# Patient Record
Sex: MALE | Race: BLACK OR AFRICAN AMERICAN | NOT HISPANIC OR LATINO
[De-identification: names, ages, dates, MRNs, and addresses within clinical notes are randomized per-mention and may not be internally consistent; named-entity substitution may affect disease eponyms.]

---

## 2020-09-28 PROBLEM — Z00.00 ENCOUNTER FOR PREVENTIVE HEALTH EXAMINATION: Status: ACTIVE | Noted: 2020-09-28

## 2020-10-20 ENCOUNTER — APPOINTMENT (OUTPATIENT)
Dept: ORTHOPEDIC SURGERY | Facility: CLINIC | Age: 50
End: 2020-10-20
Payer: MEDICAID

## 2020-10-20 ENCOUNTER — LABORATORY RESULT (OUTPATIENT)
Age: 50
End: 2020-10-20

## 2020-10-20 VITALS
WEIGHT: 245 LBS | DIASTOLIC BLOOD PRESSURE: 86 MMHG | TEMPERATURE: 95.5 F | HEART RATE: 69 BPM | BODY MASS INDEX: 33.18 KG/M2 | SYSTOLIC BLOOD PRESSURE: 130 MMHG | HEIGHT: 72 IN

## 2020-10-20 PROCEDURE — 99072 ADDL SUPL MATRL&STAF TM PHE: CPT

## 2020-10-20 PROCEDURE — 73562 X-RAY EXAM OF KNEE 3: CPT | Mod: RT

## 2020-10-20 PROCEDURE — 20610 DRAIN/INJ JOINT/BURSA W/O US: CPT | Mod: RT

## 2020-10-20 PROCEDURE — 99204 OFFICE O/P NEW MOD 45 MIN: CPT | Mod: 25

## 2020-10-20 RX ADMIN — METHYLPREDNISOLONE ACETATE 2 MG/ML: 40 INJECTION, SUSPENSION INTRALESIONAL; INTRAMUSCULAR; INTRASYNOVIAL; SOFT TISSUE at 00:00

## 2020-10-20 RX ADMIN — LIDOCAINE HYDROCHLORIDE 10 %: 10 INJECTION, SOLUTION INFILTRATION; PERINEURAL at 00:00

## 2020-10-20 RX ADMIN — LIDOCAINE HYDROCHLORIDE 3 %: 10 INJECTION, SOLUTION INFILTRATION; PERINEURAL at 00:00

## 2020-10-21 RX ORDER — LIDOCAINE HYDROCHLORIDE 10 MG/ML
1 INJECTION, SOLUTION INFILTRATION; PERINEURAL
Refills: 0 | Status: COMPLETED | OUTPATIENT
Start: 2020-10-20

## 2020-10-21 RX ORDER — METHYLPRED ACET/NACL,ISO-OS/PF 40 MG/ML
40 VIAL (ML) INJECTION
Qty: 1 | Refills: 0 | Status: COMPLETED | OUTPATIENT
Start: 2020-10-20

## 2020-11-03 ENCOUNTER — RX RENEWAL (OUTPATIENT)
Age: 50
End: 2020-11-03

## 2020-11-03 RX ORDER — MELOXICAM 15 MG/1
15 TABLET ORAL DAILY
Qty: 21 | Refills: 0 | Status: ACTIVE | COMMUNITY
Start: 2020-10-20 | End: 1900-01-01

## 2020-12-29 ENCOUNTER — APPOINTMENT (OUTPATIENT)
Dept: ORTHOPEDIC SURGERY | Facility: CLINIC | Age: 50
End: 2020-12-29

## 2021-01-19 DIAGNOSIS — M25.562 PAIN IN LEFT KNEE: ICD-10-CM

## 2021-02-01 ENCOUNTER — APPOINTMENT (OUTPATIENT)
Dept: ORTHOPEDIC SURGERY | Facility: CLINIC | Age: 51
End: 2021-02-01

## 2021-02-04 ENCOUNTER — APPOINTMENT (OUTPATIENT)
Dept: ORTHOPEDIC SURGERY | Facility: CLINIC | Age: 51
End: 2021-02-04

## 2021-02-26 DIAGNOSIS — M25.561 PAIN IN RIGHT KNEE: ICD-10-CM

## 2021-02-26 DIAGNOSIS — M79.609 PAIN IN UNSPECIFIED LIMB: ICD-10-CM

## 2021-06-22 ENCOUNTER — LABORATORY RESULT (OUTPATIENT)
Age: 51
End: 2021-06-22

## 2021-06-22 ENCOUNTER — APPOINTMENT (OUTPATIENT)
Dept: ORTHOPEDIC SURGERY | Facility: CLINIC | Age: 51
End: 2021-06-22
Payer: MEDICAID

## 2021-06-22 VITALS
HEART RATE: 90 BPM | TEMPERATURE: 97.3 F | BODY MASS INDEX: 30.88 KG/M2 | SYSTOLIC BLOOD PRESSURE: 141 MMHG | HEIGHT: 72 IN | WEIGHT: 228 LBS | DIASTOLIC BLOOD PRESSURE: 98 MMHG

## 2021-06-22 DIAGNOSIS — M25.462 EFFUSION, LEFT KNEE: ICD-10-CM

## 2021-06-22 PROCEDURE — 20610 DRAIN/INJ JOINT/BURSA W/O US: CPT | Mod: 50

## 2021-06-22 PROCEDURE — 73562 X-RAY EXAM OF KNEE 3: CPT | Mod: LT

## 2021-06-22 PROCEDURE — 99213 OFFICE O/P EST LOW 20 MIN: CPT | Mod: 25

## 2021-06-22 RX ORDER — MELOXICAM 15 MG/1
15 TABLET ORAL DAILY
Qty: 21 | Refills: 1 | Status: ACTIVE | COMMUNITY
Start: 2021-06-22 | End: 1900-01-01

## 2021-06-22 RX ADMIN — LIDOCAINE HYDROCHLORIDE 3 %: 10 INJECTION, SOLUTION INFILTRATION; PERINEURAL at 00:00

## 2021-06-22 RX ADMIN — LIDOCAINE HYDROCHLORIDE 5 %: 10 INJECTION, SOLUTION INFILTRATION; PERINEURAL at 00:00

## 2021-06-22 RX ADMIN — METHYLPREDNISOLONE ACETATE 2 MG/ML: 40 INJECTION, SUSPENSION INTRALESIONAL; INTRAMUSCULAR; INTRASYNOVIAL; SOFT TISSUE at 00:00

## 2021-06-23 RX ORDER — LIDOCAINE HYDROCHLORIDE 10 MG/ML
1 INJECTION, SOLUTION INFILTRATION; PERINEURAL
Refills: 0 | Status: COMPLETED | OUTPATIENT
Start: 2021-06-22

## 2021-06-23 RX ORDER — METHYLPRED ACET/NACL,ISO-OS/PF 40 MG/ML
40 VIAL (ML) INJECTION
Qty: 1 | Refills: 0 | Status: COMPLETED | OUTPATIENT
Start: 2021-06-22

## 2021-08-17 ENCOUNTER — APPOINTMENT (OUTPATIENT)
Dept: ORTHOPEDIC SURGERY | Facility: CLINIC | Age: 51
End: 2021-08-17

## 2021-09-23 ENCOUNTER — APPOINTMENT (OUTPATIENT)
Dept: ORTHOPEDIC SURGERY | Facility: CLINIC | Age: 51
End: 2021-09-23

## 2021-09-28 ENCOUNTER — APPOINTMENT (OUTPATIENT)
Dept: ORTHOPEDIC SURGERY | Facility: CLINIC | Age: 51
End: 2021-09-28
Payer: MEDICAID

## 2021-09-28 ENCOUNTER — NON-APPOINTMENT (OUTPATIENT)
Age: 51
End: 2021-09-28

## 2021-09-28 VITALS — WEIGHT: 228 LBS | BODY MASS INDEX: 30.88 KG/M2 | HEIGHT: 72 IN

## 2021-09-28 DIAGNOSIS — M17.0 BILATERAL PRIMARY OSTEOARTHRITIS OF KNEE: ICD-10-CM

## 2021-09-28 DIAGNOSIS — M25.461 EFFUSION, RIGHT KNEE: ICD-10-CM

## 2021-09-28 PROCEDURE — 99211 OFF/OP EST MAY X REQ PHY/QHP: CPT | Mod: 25

## 2021-09-28 PROCEDURE — 73562 X-RAY EXAM OF KNEE 3: CPT | Mod: LT

## 2021-09-28 PROCEDURE — 20610 DRAIN/INJ JOINT/BURSA W/O US: CPT | Mod: LT

## 2021-09-28 RX ORDER — MELOXICAM 15 MG/1
15 TABLET ORAL DAILY
Qty: 21 | Refills: 1 | Status: ACTIVE | COMMUNITY
Start: 2021-09-28 | End: 1900-01-01

## 2021-09-28 RX ADMIN — METHYLPREDNISOLONE ACETATE 2 MG/ML: 40 INJECTION, SUSPENSION INTRALESIONAL; INTRAMUSCULAR; INTRASYNOVIAL; SOFT TISSUE at 00:00

## 2021-09-28 RX ADMIN — LIDOCAINE HYDROCHLORIDE 3 %: 10 INJECTION, SOLUTION INFILTRATION; PERINEURAL at 00:00

## 2021-09-28 RX ADMIN — LIDOCAINE HYDROCHLORIDE 5 %: 10 INJECTION, SOLUTION INFILTRATION; PERINEURAL at 00:00

## 2021-09-29 RX ORDER — METHYLPRED ACET/NACL,ISO-OS/PF 40 MG/ML
40 VIAL (ML) INJECTION
Qty: 1 | Refills: 0 | Status: COMPLETED | OUTPATIENT
Start: 2021-09-28

## 2021-09-29 RX ORDER — LIDOCAINE HYDROCHLORIDE 10 MG/ML
1 INJECTION, SOLUTION INFILTRATION; PERINEURAL
Refills: 0 | Status: COMPLETED | OUTPATIENT
Start: 2021-09-28

## 2024-04-30 ENCOUNTER — OFFICE (OUTPATIENT)
Dept: URBAN - METROPOLITAN AREA CLINIC 12 | Facility: CLINIC | Age: 54
Setting detail: OPHTHALMOLOGY
End: 2024-04-30
Payer: MEDICAID

## 2024-04-30 DIAGNOSIS — H31.003: ICD-10-CM

## 2024-04-30 DIAGNOSIS — H25.11: ICD-10-CM

## 2024-04-30 DIAGNOSIS — H40.1131: ICD-10-CM

## 2024-04-30 PROCEDURE — 92004 COMPRE OPH EXAM NEW PT 1/>: CPT | Performed by: STUDENT IN AN ORGANIZED HEALTH CARE EDUCATION/TRAINING PROGRAM

## 2024-04-30 PROCEDURE — 92020 GONIOSCOPY: CPT | Performed by: STUDENT IN AN ORGANIZED HEALTH CARE EDUCATION/TRAINING PROGRAM

## 2024-04-30 PROCEDURE — 92250 FUNDUS PHOTOGRAPHY W/I&R: CPT | Performed by: STUDENT IN AN ORGANIZED HEALTH CARE EDUCATION/TRAINING PROGRAM

## 2024-05-08 ENCOUNTER — OFFICE (OUTPATIENT)
Dept: URBAN - METROPOLITAN AREA CLINIC 12 | Facility: CLINIC | Age: 54
Setting detail: OPHTHALMOLOGY
End: 2024-05-08
Payer: MEDICAID

## 2024-05-08 ENCOUNTER — RX ONLY (RX ONLY)
Age: 54
End: 2024-05-08

## 2024-05-08 DIAGNOSIS — H31.003: ICD-10-CM

## 2024-05-08 DIAGNOSIS — H25.13: ICD-10-CM

## 2024-05-08 DIAGNOSIS — H40.063: ICD-10-CM

## 2024-05-08 PROCEDURE — 76514 ECHO EXAM OF EYE THICKNESS: CPT | Performed by: STUDENT IN AN ORGANIZED HEALTH CARE EDUCATION/TRAINING PROGRAM

## 2024-05-08 PROCEDURE — 92014 COMPRE OPH EXAM EST PT 1/>: CPT | Performed by: STUDENT IN AN ORGANIZED HEALTH CARE EDUCATION/TRAINING PROGRAM

## 2024-05-08 ASSESSMENT — CONFRONTATIONAL VISUAL FIELD TEST (CVF)
OD_FINDINGS: FULL
OS_FINDINGS: FULL

## 2024-05-09 ENCOUNTER — Encounter (OUTPATIENT)
Dept: URBAN - METROPOLITAN AREA CLINIC 12 | Facility: CLINIC | Age: 54
Setting detail: OPHTHALMOLOGY
End: 2024-05-09
Payer: MEDICAID

## 2024-05-13 ENCOUNTER — OFFICE (OUTPATIENT)
Dept: URBAN - METROPOLITAN AREA CLINIC 12 | Facility: CLINIC | Age: 54
Setting detail: OPHTHALMOLOGY
End: 2024-05-13
Payer: MEDICAID

## 2024-05-13 DIAGNOSIS — H40.061: ICD-10-CM

## 2024-05-13 PROCEDURE — 66761 REVISION OF IRIS: CPT | Mod: RT | Performed by: STUDENT IN AN ORGANIZED HEALTH CARE EDUCATION/TRAINING PROGRAM

## 2024-05-13 ASSESSMENT — CONFRONTATIONAL VISUAL FIELD TEST (CVF)
OD_FINDINGS: FULL
OS_FINDINGS: FULL

## 2024-05-22 ENCOUNTER — RX ONLY (RX ONLY)
Age: 54
End: 2024-05-22

## 2024-05-22 ENCOUNTER — OFFICE (OUTPATIENT)
Dept: URBAN - METROPOLITAN AREA CLINIC 12 | Facility: CLINIC | Age: 54
Setting detail: OPHTHALMOLOGY
End: 2024-05-22
Payer: MEDICAID

## 2024-05-22 DIAGNOSIS — H40.062: ICD-10-CM

## 2024-05-22 PROCEDURE — 66761 REVISION OF IRIS: CPT | Mod: 79,LT | Performed by: STUDENT IN AN ORGANIZED HEALTH CARE EDUCATION/TRAINING PROGRAM

## 2024-05-22 ASSESSMENT — CONFRONTATIONAL VISUAL FIELD TEST (CVF)
OD_FINDINGS: FULL
OS_FINDINGS: FULL

## 2024-05-29 ENCOUNTER — OFFICE (OUTPATIENT)
Dept: URBAN - METROPOLITAN AREA CLINIC 12 | Facility: CLINIC | Age: 54
Setting detail: OPHTHALMOLOGY
End: 2024-05-29
Payer: MEDICAID

## 2024-05-29 DIAGNOSIS — H40.063: ICD-10-CM

## 2024-05-29 PROBLEM — H40.033 NARROW ANGLE GLAUCOMA SUSPECT; BOTH EYES: Status: ACTIVE | Noted: 2024-05-29

## 2024-05-29 PROBLEM — H25.13 CATARACT SENILE NUCLEAR SCLEROSIS; BOTH EYES: Status: ACTIVE | Noted: 2024-05-08

## 2024-05-29 PROCEDURE — 99024 POSTOP FOLLOW-UP VISIT: CPT | Performed by: STUDENT IN AN ORGANIZED HEALTH CARE EDUCATION/TRAINING PROGRAM

## 2024-05-29 ASSESSMENT — CONFRONTATIONAL VISUAL FIELD TEST (CVF)
OS_FINDINGS: FULL
OD_FINDINGS: FULL

## 2024-06-06 ENCOUNTER — RX ONLY (RX ONLY)
Age: 54
End: 2024-06-06

## 2024-06-06 ENCOUNTER — OFFICE (OUTPATIENT)
Dept: URBAN - METROPOLITAN AREA CLINIC 12 | Facility: CLINIC | Age: 54
Setting detail: OPHTHALMOLOGY
End: 2024-06-06
Payer: COMMERCIAL

## 2024-06-06 DIAGNOSIS — H40.063: ICD-10-CM

## 2024-06-06 DIAGNOSIS — H25.13: ICD-10-CM

## 2024-06-06 PROCEDURE — 99214 OFFICE O/P EST MOD 30 MIN: CPT | Performed by: STUDENT IN AN ORGANIZED HEALTH CARE EDUCATION/TRAINING PROGRAM

## 2024-06-06 ASSESSMENT — CONFRONTATIONAL VISUAL FIELD TEST (CVF)
OD_FINDINGS: FULL
OS_FINDINGS: FULL

## 2024-06-17 ENCOUNTER — RX ONLY (RX ONLY)
Age: 54
End: 2024-06-17

## 2024-06-17 ENCOUNTER — OFFICE (OUTPATIENT)
Dept: URBAN - METROPOLITAN AREA CLINIC 12 | Facility: CLINIC | Age: 54
Setting detail: OPHTHALMOLOGY
End: 2024-06-17
Payer: COMMERCIAL

## 2024-06-17 DIAGNOSIS — H25.13: ICD-10-CM

## 2024-06-17 DIAGNOSIS — H40.063: ICD-10-CM

## 2024-06-17 PROCEDURE — 99213 OFFICE O/P EST LOW 20 MIN: CPT | Performed by: STUDENT IN AN ORGANIZED HEALTH CARE EDUCATION/TRAINING PROGRAM

## 2024-06-17 ASSESSMENT — CONFRONTATIONAL VISUAL FIELD TEST (CVF)
OS_FINDINGS: FULL
OD_FINDINGS: FULL

## 2024-07-05 ENCOUNTER — OFFICE (OUTPATIENT)
Dept: URBAN - METROPOLITAN AREA CLINIC 12 | Facility: CLINIC | Age: 54
Setting detail: OPHTHALMOLOGY
End: 2024-07-05
Payer: COMMERCIAL

## 2024-07-05 DIAGNOSIS — H40.063: ICD-10-CM

## 2024-07-05 PROCEDURE — 99213 OFFICE O/P EST LOW 20 MIN: CPT | Performed by: STUDENT IN AN ORGANIZED HEALTH CARE EDUCATION/TRAINING PROGRAM

## 2024-07-05 ASSESSMENT — CONFRONTATIONAL VISUAL FIELD TEST (CVF)
OS_FINDINGS: FULL
OD_FINDINGS: FULL

## 2024-07-15 ENCOUNTER — OFFICE (OUTPATIENT)
Dept: URBAN - METROPOLITAN AREA CLINIC 12 | Facility: CLINIC | Age: 54
Setting detail: OPHTHALMOLOGY
End: 2024-07-15
Payer: COMMERCIAL

## 2024-07-15 ENCOUNTER — RX ONLY (RX ONLY)
Age: 54
End: 2024-07-15

## 2024-07-15 DIAGNOSIS — H40.063: ICD-10-CM

## 2024-07-15 PROCEDURE — 99213 OFFICE O/P EST LOW 20 MIN: CPT | Performed by: STUDENT IN AN ORGANIZED HEALTH CARE EDUCATION/TRAINING PROGRAM

## 2024-07-15 ASSESSMENT — CONFRONTATIONAL VISUAL FIELD TEST (CVF)
OS_FINDINGS: FULL
OD_FINDINGS: FULL

## 2024-08-23 ENCOUNTER — APPOINTMENT (OUTPATIENT)
Dept: ORTHOPEDIC SURGERY | Facility: CLINIC | Age: 54
End: 2024-08-23
Payer: MEDICAID

## 2024-08-23 VITALS — SYSTOLIC BLOOD PRESSURE: 132 MMHG | HEART RATE: 67 BPM | DIASTOLIC BLOOD PRESSURE: 95 MMHG

## 2024-08-23 DIAGNOSIS — M17.0 BILATERAL PRIMARY OSTEOARTHRITIS OF KNEE: ICD-10-CM

## 2024-08-23 PROCEDURE — 99214 OFFICE O/P EST MOD 30 MIN: CPT | Mod: 25

## 2024-08-23 PROCEDURE — 73562 X-RAY EXAM OF KNEE 3: CPT | Mod: LT,RT

## 2024-08-23 RX ORDER — TRAZODONE HYDROCHLORIDE 300 MG/1
TABLET ORAL
Refills: 0 | Status: ACTIVE | COMMUNITY

## 2024-08-23 RX ORDER — AMLODIPINE BESYLATE 5 MG/1
TABLET ORAL
Refills: 0 | Status: ACTIVE | COMMUNITY

## 2024-08-23 RX ORDER — ATORVASTATIN CALCIUM 80 MG/1
TABLET, FILM COATED ORAL
Refills: 0 | Status: ACTIVE | COMMUNITY

## 2024-08-25 NOTE — DISCUSSION/SUMMARY
[de-identified] : After thorough history full examination and review of the x-rays.  It was explained to the patient that he does have osteoarthritic changes in both the left and right knee.  He was explained the different modalities of treatment which include conservative versus surgical intervention.  He was also spoken to about viscosupplementation injections as well as cortisone injections.  He was explained the risk benefits pros and cons to each and all of the possible treatments and outcomes.  However he did state that he would like to try the viscosupplementation injections.  Therefore a formal request through the insurance company will be made.  And once authorized he will return back to the office to have administered to the left and right knee.  In the meantime it is suggested that he continue with conservative measures including therapy exercises and anti-inflammatories as needed.  45 minutes were spent, face to face, in direct consultation with the patient. This includes reviewing the natural history of their Dx., eliciting the history, performing an orthopedic exam, review of the x-ray findings, forming a differential Dx and discussing all treatment options. This Includes both surgical and non-surgical treatments. I also reviewed all the risks and benefits of non-operative & operative Tx options, future impact into orthopedic functions/problems, activity restrictions both at home and at work, and all follow up requirements.

## 2024-08-25 NOTE — HISTORY OF PRESENT ILLNESS
[de-identified] : Patient is a 54-year-old male who presents today for an evaluation of both knees.  He states that he had some discomfort in both knees for a prolonged period of time.  I was even seen in this office in 2021.  He was explained that he does have some osteoarthritic changes at that time.  And in the future may require knee replacement.  However he states that he has been treated conservatively.  And presents today for a possible viscosupplementation injection.  He states that he does have some swelling and clicking and that the pain is worse with any strenuous activities.  Currently the pain is a 5 on a scale of 1-10.

## 2024-08-25 NOTE — PHYSICAL EXAM
[Antalgic] : antalgic [LE] : Sensory: Intact in bilateral lower extremities [ALL] : dorsalis pedis, posterior tibial, femoral, popliteal, and radial 2+ and symmetric bilaterally [de-identified] : On physical examination of both the left and right knee.  The skin is clean dry and intact with no areas of redness swelling or heat noted.  There is some diffuse pain and tenderness mostly in the patellofemoral compartment as well as in the medial femoral-tibial compartment.  With the right side being worse.  He does have excellent range of motion.  His he is able to fully extend the knee with flexion to approximately 125 degrees in both knees.  This is done both passive and actively.  There is no evidence of ligamentous laxity.  No evidence of any muscle atrophy.  No evidence of any motor or sensory deficit.  Patient has good distal pulses with no calf tenderness. [de-identified] : X-rays of the right knee were taken today. This includes 3 views. The knee AP, lateral and sunrise views. They reveal osteoarthritic changes in all 3 views.  There is narrowing of the joint spacing in the patellofemoral compartment but mostly in the medial femoral-tibial compartment.  There is osteophyte formation as well as areas of sclerosis and cystic formation.  There is no evidence of any fracture or dislocation noted.  X-rays of the left knee were taken today. This includes 3 views. The knee AP, lateral and sunrise views. They reveal osteoarthritic changes in all 3 views.  There is narrowing of the joint spacing in the patellofemoral compartment but mostly in the medial femoral-tibial compartment.  There is osteophyte formation as well as areas of sclerosis and cystic formation.  There is no evidence of any fracture or dislocation noted.

## 2024-08-25 NOTE — DISCUSSION/SUMMARY
[de-identified] : After thorough history full examination and review of the x-rays.  It was explained to the patient that he does have osteoarthritic changes in both the left and right knee.  He was explained the different modalities of treatment which include conservative versus surgical intervention.  He was also spoken to about viscosupplementation injections as well as cortisone injections.  He was explained the risk benefits pros and cons to each and all of the possible treatments and outcomes.  However he did state that he would like to try the viscosupplementation injections.  Therefore a formal request through the insurance company will be made.  And once authorized he will return back to the office to have administered to the left and right knee.  In the meantime it is suggested that he continue with conservative measures including therapy exercises and anti-inflammatories as needed.  45 minutes were spent, face to face, in direct consultation with the patient. This includes reviewing the natural history of their Dx., eliciting the history, performing an orthopedic exam, review of the x-ray findings, forming a differential Dx and discussing all treatment options. This Includes both surgical and non-surgical treatments. I also reviewed all the risks and benefits of non-operative & operative Tx options, future impact into orthopedic functions/problems, activity restrictions both at home and at work, and all follow up requirements.

## 2024-08-25 NOTE — PHYSICAL EXAM
[Antalgic] : antalgic [LE] : Sensory: Intact in bilateral lower extremities [ALL] : dorsalis pedis, posterior tibial, femoral, popliteal, and radial 2+ and symmetric bilaterally [de-identified] : On physical examination of both the left and right knee.  The skin is clean dry and intact with no areas of redness swelling or heat noted.  There is some diffuse pain and tenderness mostly in the patellofemoral compartment as well as in the medial femoral-tibial compartment.  With the right side being worse.  He does have excellent range of motion.  His he is able to fully extend the knee with flexion to approximately 125 degrees in both knees.  This is done both passive and actively.  There is no evidence of ligamentous laxity.  No evidence of any muscle atrophy.  No evidence of any motor or sensory deficit.  Patient has good distal pulses with no calf tenderness. [de-identified] : X-rays of the right knee were taken today. This includes 3 views. The knee AP, lateral and sunrise views. They reveal osteoarthritic changes in all 3 views.  There is narrowing of the joint spacing in the patellofemoral compartment but mostly in the medial femoral-tibial compartment.  There is osteophyte formation as well as areas of sclerosis and cystic formation.  There is no evidence of any fracture or dislocation noted.  X-rays of the left knee were taken today. This includes 3 views. The knee AP, lateral and sunrise views. They reveal osteoarthritic changes in all 3 views.  There is narrowing of the joint spacing in the patellofemoral compartment but mostly in the medial femoral-tibial compartment.  There is osteophyte formation as well as areas of sclerosis and cystic formation.  There is no evidence of any fracture or dislocation noted.

## 2024-08-25 NOTE — HISTORY OF PRESENT ILLNESS
[de-identified] : Patient is a 54-year-old male who presents today for an evaluation of both knees.  He states that he had some discomfort in both knees for a prolonged period of time.  I was even seen in this office in 2021.  He was explained that he does have some osteoarthritic changes at that time.  And in the future may require knee replacement.  However he states that he has been treated conservatively.  And presents today for a possible viscosupplementation injection.  He states that he does have some swelling and clicking and that the pain is worse with any strenuous activities.  Currently the pain is a 5 on a scale of 1-10.

## 2024-08-26 ENCOUNTER — OFFICE (OUTPATIENT)
Dept: URBAN - METROPOLITAN AREA CLINIC 12 | Facility: CLINIC | Age: 54
Setting detail: OPHTHALMOLOGY
End: 2024-08-26
Payer: COMMERCIAL

## 2024-08-26 DIAGNOSIS — H40.063: ICD-10-CM

## 2024-08-26 PROCEDURE — 99213 OFFICE O/P EST LOW 20 MIN: CPT | Performed by: STUDENT IN AN ORGANIZED HEALTH CARE EDUCATION/TRAINING PROGRAM

## 2024-08-26 ASSESSMENT — CONFRONTATIONAL VISUAL FIELD TEST (CVF)
OS_FINDINGS: FULL
OD_FINDINGS: FULL

## 2024-08-29 RX ORDER — HYALURONATE SODIUM, STABILIZED 88 MG/4 ML
88 SYRINGE (ML) INTRAARTICULAR
Qty: 2 | Refills: 0 | Status: ACTIVE | OUTPATIENT
Start: 2024-08-26

## 2024-10-09 ENCOUNTER — OFFICE (OUTPATIENT)
Dept: URBAN - METROPOLITAN AREA CLINIC 12 | Facility: CLINIC | Age: 54
Setting detail: OPHTHALMOLOGY
End: 2024-10-09
Payer: COMMERCIAL

## 2024-10-09 DIAGNOSIS — H40.063: ICD-10-CM

## 2024-10-09 DIAGNOSIS — H25.13: ICD-10-CM

## 2024-10-09 PROCEDURE — 99213 OFFICE O/P EST LOW 20 MIN: CPT | Performed by: STUDENT IN AN ORGANIZED HEALTH CARE EDUCATION/TRAINING PROGRAM

## 2024-10-09 PROCEDURE — 92133 CPTRZD OPH DX IMG PST SGM ON: CPT | Performed by: STUDENT IN AN ORGANIZED HEALTH CARE EDUCATION/TRAINING PROGRAM

## 2024-10-09 PROCEDURE — 92083 EXTENDED VISUAL FIELD XM: CPT | Performed by: STUDENT IN AN ORGANIZED HEALTH CARE EDUCATION/TRAINING PROGRAM

## 2024-10-09 ASSESSMENT — REFRACTION_CURRENTRX
OS_SPHERE: +2.25
OD_SPHERE: +2.00
OS_AXIS: 087
OD_ADD: +2.50
OS_CYLINDER: -0.25
OD_OVR_VA: 20/
OD_ADD: +2.50
OS_CYLINDER: -0.50
OS_OVR_VA: 20/
OD_VPRISM_DIRECTION: BF
OD_AXIS: 89
OS_AXIS: 98
OD_CYLINDER: -0.75
OS_OVR_VA: 20/
OD_VPRISM_DIRECTION: SV
OS_SPHERE: +2.00
OD_SPHERE: +2.00
OS_VPRISM_DIRECTION: BF
OD_CYLINDER: -0.75
OD_AXIS: 093
OS_ADD: +2.50
OS_ADD: +2.50
OS_VPRISM_DIRECTION: SV
OD_OVR_VA: 20/

## 2024-10-09 ASSESSMENT — KERATOMETRY
OS_K1POWER_DIOPTERS: 42.25
OD_K1POWER_DIOPTERS: 41.75
OS_K2POWER_DIOPTERS: 42.50
OD_AXISANGLE_DEGREES: 131
OS_AXISANGLE_DEGREES: 041
OD_K2POWER_DIOPTERS: 42.50
METHOD_AUTO_MANUAL: AUTO

## 2024-10-09 ASSESSMENT — REFRACTION_MANIFEST
OD_AXIS: 094
OS_SPHERE: +2.50
OD_VA1: 20/20
OD_SPHERE: +2.75
OS_AXIS: 090
OD_VA1: 20/20
OD_SPHERE: +2.25
OD_CYLINDER: -1.25
OS_CYLINDER: -0.75
OS_AXIS: 086
OS_VA1: 20/20
OS_SPHERE: +2.75
OD_CYLINDER: -0.75
OS_CYLINDER: -0.50
OS_VA1: 20/25
OD_AXIS: 098

## 2024-10-09 ASSESSMENT — REFRACTION_AUTOREFRACTION
OS_AXIS: 081
OS_SPHERE: +3.50
OD_CYLINDER: -1.50
OD_AXIS: 103
OD_SPHERE: +3.00
OS_CYLINDER: -1.25

## 2024-10-09 ASSESSMENT — CONFRONTATIONAL VISUAL FIELD TEST (CVF)
OS_FINDINGS: FULL
OD_FINDINGS: FULL

## 2024-10-09 ASSESSMENT — VISUAL ACUITY
OD_BCVA: 20/25-1
OS_BCVA: 20/25-1

## 2025-01-07 ENCOUNTER — OFFICE (OUTPATIENT)
Facility: LOCATION | Age: 55
Setting detail: OPHTHALMOLOGY
End: 2025-01-07
Payer: COMMERCIAL

## 2025-01-07 DIAGNOSIS — H40.063: ICD-10-CM

## 2025-01-07 DIAGNOSIS — H25.13: ICD-10-CM

## 2025-01-07 PROCEDURE — 92012 INTRM OPH EXAM EST PATIENT: CPT | Performed by: OPHTHALMOLOGY

## 2025-01-07 ASSESSMENT — REFRACTION_CURRENTRX
OD_OVR_VA: 20/
OS_OVR_VA: 20/
OD_ADD: +2.50
OS_CYLINDER: -0.50
OD_SPHERE: +2.00
OD_CYLINDER: -0.75
OS_SPHERE: +2.00
OD_CYLINDER: -0.75
OS_AXIS: 98
OD_AXIS: 084
OS_CYLINDER: -0.25
OS_SPHERE: +2.25
OS_ADD: +2.50
OD_AXIS: 89
OS_OVR_VA: 20/
OS_VPRISM_DIRECTION: SV
OD_OVR_VA: 20/
OS_ADD: +2.50
OS_AXIS: 091
OS_VPRISM_DIRECTION: BF
OD_SPHERE: +2.00
OD_ADD: +2.25
OD_VPRISM_DIRECTION: BF
OD_VPRISM_DIRECTION: SV

## 2025-01-07 ASSESSMENT — REFRACTION_AUTOREFRACTION
OS_SPHERE: +2.75
OS_CYLINDER: -0.75
OD_SPHERE: +2.50
OD_CYLINDER: -0.75
OS_AXIS: 085
OD_AXIS: 095

## 2025-01-07 ASSESSMENT — CONFRONTATIONAL VISUAL FIELD TEST (CVF)
OS_FINDINGS: FULL
OD_FINDINGS: FULL

## 2025-01-07 ASSESSMENT — REFRACTION_MANIFEST
OS_SPHERE: +2.75
OS_AXIS: 086
OS_SPHERE: +2.50
OD_SPHERE: +2.75
OD_AXIS: 098
OD_AXIS: 094
OS_VA1: 20/20
OS_AXIS: 090
OS_VA1: 20/25
OD_SPHERE: +2.25
OS_CYLINDER: -0.75
OS_CYLINDER: -0.50
OD_VA1: 20/20
OD_CYLINDER: -1.25
OD_CYLINDER: -0.75
OD_VA1: 20/20

## 2025-01-07 ASSESSMENT — PACHYMETRY
OD_CT_UM: 608
OS_CT_CORRECTION: -5
OS_CT_UM: 615
OD_CT_CORRECTION: -4

## 2025-01-07 ASSESSMENT — TONOMETRY
OS_IOP_MMHG: 16
OD_IOP_MMHG: 17

## 2025-01-07 ASSESSMENT — KERATOMETRY
OD_K2POWER_DIOPTERS: 42.25
OD_K1POWER_DIOPTERS: 42.00
OS_AXISANGLE_DEGREES: 169
OS_K1POWER_DIOPTERS: 42.00
OD_AXISANGLE_DEGREES: 030
OS_K2POWER_DIOPTERS: 42.25
METHOD_AUTO_MANUAL: AUTO

## 2025-01-07 ASSESSMENT — VISUAL ACUITY
OD_BCVA: 20/25-
OS_BCVA: 20/25-

## 2025-01-23 ENCOUNTER — RX ONLY (RX ONLY)
Age: 55
End: 2025-01-23

## 2025-01-23 ENCOUNTER — OFFICE (OUTPATIENT)
Facility: LOCATION | Age: 55
Setting detail: OPHTHALMOLOGY
End: 2025-01-23
Payer: COMMERCIAL

## 2025-01-23 DIAGNOSIS — H40.063: ICD-10-CM

## 2025-01-23 DIAGNOSIS — H25.13: ICD-10-CM

## 2025-01-23 PROCEDURE — 92012 INTRM OPH EXAM EST PATIENT: CPT | Performed by: OPHTHALMOLOGY

## 2025-01-23 ASSESSMENT — REFRACTION_MANIFEST
OS_VA1: 20/20
OD_AXIS: 098
OS_VA1: 20/25
OD_VA1: 20/20
OD_CYLINDER: -0.75
OS_CYLINDER: -0.50
OS_SPHERE: +2.50
OS_SPHERE: +2.75
OS_AXIS: 086
OS_CYLINDER: -0.75
OD_SPHERE: +2.75
OS_AXIS: 090
OD_VA1: 20/20
OD_SPHERE: +2.25
OD_CYLINDER: -1.25
OD_AXIS: 094

## 2025-01-23 ASSESSMENT — REFRACTION_CURRENTRX
OD_AXIS: 084
OD_SPHERE: +2.00
OS_SPHERE: +2.25
OS_OVR_VA: 20/
OS_VPRISM_DIRECTION: SV
OS_SPHERE: +2.00
OD_CYLINDER: -0.75
OS_ADD: +2.50
OD_ADD: +2.50
OS_ADD: +2.50
OS_CYLINDER: -0.50
OD_CYLINDER: -0.75
OS_AXIS: 98
OD_OVR_VA: 20/
OD_SPHERE: +2.00
OS_OVR_VA: 20/
OD_ADD: +2.25
OS_AXIS: 091
OD_VPRISM_DIRECTION: BF
OS_CYLINDER: -0.25
OD_VPRISM_DIRECTION: SV
OS_VPRISM_DIRECTION: BF
OD_AXIS: 89
OD_OVR_VA: 20/

## 2025-01-23 ASSESSMENT — PACHYMETRY
OS_CT_UM: 615
OS_CT_CORRECTION: -5
OD_CT_CORRECTION: -4
OD_CT_UM: 608

## 2025-01-23 ASSESSMENT — KERATOMETRY
OD_K2POWER_DIOPTERS: 42.50
OS_AXISANGLE_DEGREES: 090
OS_K2POWER_DIOPTERS: 42.25
METHOD_AUTO_MANUAL: AUTO
OD_K1POWER_DIOPTERS: 42.25
OS_K1POWER_DIOPTERS: 42.25
OD_AXISANGLE_DEGREES: 028

## 2025-01-23 ASSESSMENT — VISUAL ACUITY
OS_BCVA: 20/40
OD_BCVA: 20/20

## 2025-01-23 ASSESSMENT — REFRACTION_AUTOREFRACTION
OD_CYLINDER: -1.00
OS_AXIS: 084
OD_SPHERE: +2.25
OS_SPHERE: +2.50
OD_AXIS: 097
OS_CYLINDER: -0.75

## 2025-01-23 ASSESSMENT — TONOMETRY
OS_IOP_MMHG: 16
OD_IOP_MMHG: 13

## 2025-01-23 ASSESSMENT — CONFRONTATIONAL VISUAL FIELD TEST (CVF)
OS_FINDINGS: FULL
OD_FINDINGS: FULL

## 2025-02-20 ENCOUNTER — OFFICE (OUTPATIENT)
Facility: LOCATION | Age: 55
Setting detail: OPHTHALMOLOGY
End: 2025-02-20
Payer: COMMERCIAL

## 2025-02-20 DIAGNOSIS — H25.13: ICD-10-CM

## 2025-02-20 DIAGNOSIS — H40.063: ICD-10-CM

## 2025-02-20 PROCEDURE — 99213 OFFICE O/P EST LOW 20 MIN: CPT | Performed by: OPHTHALMOLOGY

## 2025-02-20 ASSESSMENT — REFRACTION_CURRENTRX
OS_CYLINDER: -0.50
OD_AXIS: 177
OD_VPRISM_DIRECTION: SV
OD_ADD: +2.25
OD_SPHERE: +2.00
OS_SPHERE: +2.25
OD_SPHERE: +2.75
OD_OVR_VA: 20/
OS_OVR_VA: 20/
OS_VPRISM_DIRECTION: SV
OD_OVR_VA: 20/
OS_AXIS: 086
OD_CYLINDER: -3.25
OS_AXIS: 98
OS_ADD: +2.50
OS_VPRISM_DIRECTION: BF
OS_OVR_VA: 20/
OD_CYLINDER: -0.75
OS_ADD: +2.50
OD_ADD: +2.50
OD_VPRISM_DIRECTION: BF
OS_CYLINDER: -0.75
OS_SPHERE: +2.00
OD_AXIS: 89

## 2025-02-20 ASSESSMENT — REFRACTION_AUTOREFRACTION
OS_CYLINDER: -0.25
OD_CYLINDER: -0.75
OS_AXIS: 123
OD_AXIS: 101
OD_SPHERE: +2.50
OS_SPHERE: +2.50

## 2025-02-20 ASSESSMENT — CONFRONTATIONAL VISUAL FIELD TEST (CVF)
OS_FINDINGS: FULL
OD_FINDINGS: FULL

## 2025-02-20 ASSESSMENT — REFRACTION_MANIFEST
OD_CYLINDER: -1.25
OS_VA1: 20/25
OD_AXIS: 094
OS_CYLINDER: -0.50
OD_AXIS: 098
OD_VA1: 20/20
OS_SPHERE: +2.50
OD_SPHERE: +2.75
OS_AXIS: 086
OD_VA1: 20/20
OS_AXIS: 090
OS_CYLINDER: -0.75
OD_SPHERE: +2.25
OS_VA1: 20/20
OD_CYLINDER: -0.75
OS_SPHERE: +2.75

## 2025-02-20 ASSESSMENT — KERATOMETRY
OS_K1POWER_DIOPTERS: 42.25
OS_AXISANGLE_DEGREES: 153
OD_AXISANGLE_DEGREES: 033
OD_K1POWER_DIOPTERS: 42.25
OD_K2POWER_DIOPTERS: 42.50
METHOD_AUTO_MANUAL: AUTO
OS_K2POWER_DIOPTERS: 3.00

## 2025-02-20 ASSESSMENT — TONOMETRY
OD_IOP_MMHG: 14
OS_IOP_MMHG: 16

## 2025-02-20 ASSESSMENT — VISUAL ACUITY
OD_BCVA: 20/20-1
OS_BCVA: 20/20-1

## 2025-02-20 ASSESSMENT — PACHYMETRY
OS_CT_CORRECTION: -5
OS_CT_UM: 615
OD_CT_CORRECTION: -4
OD_CT_UM: 608

## 2025-03-26 ENCOUNTER — OFFICE (OUTPATIENT)
Facility: LOCATION | Age: 55
Setting detail: OPHTHALMOLOGY
End: 2025-03-26
Payer: COMMERCIAL

## 2025-03-26 DIAGNOSIS — H40.063: ICD-10-CM

## 2025-03-26 DIAGNOSIS — H25.13: ICD-10-CM

## 2025-03-26 PROCEDURE — 99212 OFFICE O/P EST SF 10 MIN: CPT | Performed by: OPHTHALMOLOGY

## 2025-03-26 ASSESSMENT — REFRACTION_CURRENTRX
OD_ADD: +2.50
OS_AXIS: 086
OS_CYLINDER: -0.75
OD_SPHERE: +2.00
OS_AXIS: 98
OS_ADD: +2.50
OD_ADD: +2.25
OS_CYLINDER: -0.50
OD_AXIS: 89
OS_SPHERE: +2.00
OD_CYLINDER: -0.75
OS_VPRISM_DIRECTION: BF
OD_OVR_VA: 20/
OS_OVR_VA: 20/
OD_SPHERE: +2.75
OD_OVR_VA: 20/
OD_AXIS: 177
OS_SPHERE: +2.25
OS_VPRISM_DIRECTION: SV
OS_OVR_VA: 20/
OS_ADD: +2.50
OD_CYLINDER: -3.25
OD_VPRISM_DIRECTION: BF
OD_VPRISM_DIRECTION: SV

## 2025-03-26 ASSESSMENT — REFRACTION_AUTOREFRACTION
OD_CYLINDER: -0.75
OS_CYLINDER: -0.50
OS_SPHERE: +2.50
OD_AXIS: 060
OD_SPHERE: +3.00
OS_AXIS: 090

## 2025-03-26 ASSESSMENT — REFRACTION_MANIFEST
OD_VA1: 20/20
OS_VA1: 20/20
OD_AXIS: 094
OD_VA1: 20/20
OD_CYLINDER: -0.75
OD_CYLINDER: -1.25
OS_AXIS: 086
OD_SPHERE: +2.25
OS_SPHERE: +2.50
OS_CYLINDER: -0.75
OS_VA1: 20/25
OS_CYLINDER: -0.50
OD_AXIS: 098
OS_SPHERE: +2.75
OS_AXIS: 090
OD_SPHERE: +2.75

## 2025-03-26 ASSESSMENT — TONOMETRY
OD_IOP_MMHG: 21
OS_IOP_MMHG: 21

## 2025-03-26 ASSESSMENT — KERATOMETRY
OS_K1POWER_DIOPTERS: 42.00
METHOD_AUTO_MANUAL: AUTO
OD_K2POWER_DIOPTERS: 41.75
OD_AXISANGLE_DEGREES: 090
OS_AXISANGLE_DEGREES: 065
OS_K2POWER_DIOPTERS: 42.25
OD_K1POWER_DIOPTERS: 41.50

## 2025-03-26 ASSESSMENT — PACHYMETRY
OD_CT_CORRECTION: -4
OD_CT_UM: 608
OS_CT_CORRECTION: -5
OS_CT_UM: 615

## 2025-03-26 ASSESSMENT — VISUAL ACUITY
OS_BCVA: 20/20-1
OD_BCVA: 20/20